# Patient Record
Sex: FEMALE | Race: WHITE
[De-identification: names, ages, dates, MRNs, and addresses within clinical notes are randomized per-mention and may not be internally consistent; named-entity substitution may affect disease eponyms.]

---

## 2018-02-25 ENCOUNTER — HOSPITAL ENCOUNTER (INPATIENT)
Dept: HOSPITAL 96 - M.ERS | Age: 73
LOS: 9 days | Discharge: NURSING, REHAB OR LONG-TERM CARE | DRG: 638 | End: 2018-03-06
Attending: FAMILY MEDICINE | Admitting: FAMILY MEDICINE
Payer: COMMERCIAL

## 2018-02-25 VITALS — SYSTOLIC BLOOD PRESSURE: 126 MMHG | DIASTOLIC BLOOD PRESSURE: 62 MMHG

## 2018-02-25 VITALS — SYSTOLIC BLOOD PRESSURE: 130 MMHG | DIASTOLIC BLOOD PRESSURE: 49 MMHG

## 2018-02-25 VITALS — WEIGHT: 220 LBS | BODY MASS INDEX: 35.36 KG/M2 | HEIGHT: 65.98 IN

## 2018-02-25 VITALS — SYSTOLIC BLOOD PRESSURE: 92 MMHG | DIASTOLIC BLOOD PRESSURE: 46 MMHG

## 2018-02-25 VITALS — SYSTOLIC BLOOD PRESSURE: 131 MMHG | DIASTOLIC BLOOD PRESSURE: 65 MMHG

## 2018-02-25 VITALS — DIASTOLIC BLOOD PRESSURE: 68 MMHG | SYSTOLIC BLOOD PRESSURE: 164 MMHG

## 2018-02-25 VITALS — DIASTOLIC BLOOD PRESSURE: 59 MMHG | SYSTOLIC BLOOD PRESSURE: 115 MMHG

## 2018-02-25 VITALS — DIASTOLIC BLOOD PRESSURE: 66 MMHG | SYSTOLIC BLOOD PRESSURE: 116 MMHG

## 2018-02-25 DIAGNOSIS — D50.9: ICD-10-CM

## 2018-02-25 DIAGNOSIS — E11.69: ICD-10-CM

## 2018-02-25 DIAGNOSIS — I25.10: ICD-10-CM

## 2018-02-25 DIAGNOSIS — Z90.49: ICD-10-CM

## 2018-02-25 DIAGNOSIS — L03.032: ICD-10-CM

## 2018-02-25 DIAGNOSIS — M86.8X7: ICD-10-CM

## 2018-02-25 DIAGNOSIS — L03.031: ICD-10-CM

## 2018-02-25 DIAGNOSIS — L97.519: ICD-10-CM

## 2018-02-25 DIAGNOSIS — Z79.899: ICD-10-CM

## 2018-02-25 DIAGNOSIS — F32.9: ICD-10-CM

## 2018-02-25 DIAGNOSIS — I10: ICD-10-CM

## 2018-02-25 DIAGNOSIS — Z85.850: ICD-10-CM

## 2018-02-25 DIAGNOSIS — K76.9: ICD-10-CM

## 2018-02-25 DIAGNOSIS — G25.81: ICD-10-CM

## 2018-02-25 DIAGNOSIS — Z87.442: ICD-10-CM

## 2018-02-25 DIAGNOSIS — R65.10: ICD-10-CM

## 2018-02-25 DIAGNOSIS — E11.40: ICD-10-CM

## 2018-02-25 DIAGNOSIS — E44.1: ICD-10-CM

## 2018-02-25 DIAGNOSIS — Z79.1: ICD-10-CM

## 2018-02-25 DIAGNOSIS — L97.529: ICD-10-CM

## 2018-02-25 DIAGNOSIS — N39.0: ICD-10-CM

## 2018-02-25 DIAGNOSIS — E11.51: ICD-10-CM

## 2018-02-25 DIAGNOSIS — B96.20: ICD-10-CM

## 2018-02-25 DIAGNOSIS — E78.5: ICD-10-CM

## 2018-02-25 DIAGNOSIS — Z95.5: ICD-10-CM

## 2018-02-25 DIAGNOSIS — K57.30: ICD-10-CM

## 2018-02-25 DIAGNOSIS — D72.829: ICD-10-CM

## 2018-02-25 DIAGNOSIS — E11.621: Primary | ICD-10-CM

## 2018-02-25 LAB
%HYPO/RBC NFR BLD AUTO: (no result) %
ABSOLUTE BASOPHILS: 0.1 THOU/UL (ref 0–0.2)
ABSOLUTE MONOCYTES: 1.1 THOU/UL (ref 0–1.2)
ALBUMIN SERPL-MCNC: 3.4 G/DL (ref 3.4–5)
ALP SERPL-CCNC: 76 U/L (ref 46–116)
ALT SERPL-CCNC: 25 U/L (ref 30–65)
ANION GAP SERPL CALC-SCNC: 7 MMOL/L (ref 7–16)
ANISOCYTOSIS BLD QL SMEAR: (no result)
AST SERPL-CCNC: 24 U/L (ref 15–37)
BACTERIA-REFLEX: (no result) /HPF
BASOPHILS NFR BLD AUTO: 1 %
BILIRUB SERPL-MCNC: 0.3 MG/DL
BILIRUB UR-MCNC: NEGATIVE MG/DL
BUN SERPL-MCNC: 22 MG/DL (ref 7–18)
CALCIUM SERPL-MCNC: 8.4 MG/DL (ref 8.5–10.1)
CHLORIDE SERPL-SCNC: 104 MMOL/L (ref 98–107)
CO2 SERPL-SCNC: 30 MMOL/L (ref 21–32)
COLOR UR: YELLOW
CREAT SERPL-MCNC: 1 MG/DL (ref 0.6–1.3)
GLUCOSE SERPL-MCNC: 171 MG/DL (ref 70–99)
GRANULOCYTES NFR BLD MANUAL: 78 %
HCT VFR BLD CALC: 27.9 % (ref 37–47)
HGB BLD-MCNC: 8.5 GM/DL (ref 12–15)
IRON SERPL-MCNC: 15 UG/DL (ref 50–175)
KETONES UR STRIP-MCNC: NEGATIVE MG/DL
LYMPHOCYTES # BLD: 1.5 THOU/UL (ref 0.8–5.3)
LYMPHOCYTES NFR BLD AUTO: 11 %
MCH RBC QN AUTO: 21.3 PG (ref 26–34)
MCHC RBC AUTO-ENTMCNC: 30.4 G/DL (ref 28–37)
MCV RBC: 69.9 FL (ref 80–100)
MICROCYTES: (no result)
MONOCYTES NFR BLD: 8 %
MPV: 8.9 FL. (ref 7.2–11.1)
NEUTROPHILS # BLD: 11 THOU/UL (ref 1.6–8.1)
NEUTS BAND NFR BLD: 2 %
NUCLEATED RBCS: 0 /100WBC
PLATELET # BLD EST: ADEQUATE 10*3/UL
PLATELET COUNT*: 327 THOU/UL (ref 150–400)
POTASSIUM SERPL-SCNC: 4 MMOL/L (ref 3.5–5.1)
PROT SERPL-MCNC: 6.8 G/DL (ref 6.4–8.2)
PROT UR QL STRIP: NEGATIVE
RBC # BLD AUTO: 4 MIL/UL (ref 4.2–5)
RBC # UR STRIP: (no result) /UL
RDW-CV: 19.4 % (ref 10.5–14.5)
SAO2 % BLD FROM PO2: 5 % (ref 20–39)
SODIUM SERPL-SCNC: 141 MMOL/L (ref 136–145)
SP GR UR STRIP: 1.02 (ref 1–1.03)
SQUAMOUS: (no result) /LPF (ref 0–3)
URINE CLARITY: CLEAR
URINE GLUCOSE-RANDOM: NEGATIVE
URINE LEUKOCYTES-REFLEX: (no result)
URINE NITRITE-REFLEX: POSITIVE
URINE WBC-REFLEX: (no result) /HPF (ref 0–5)
UROBILINOGEN UR STRIP-ACNC: 0.2 E.U./DL (ref 0.2–1)
WBC # BLD AUTO: 13.8 THOU/UL (ref 4–11)

## 2018-02-26 VITALS — DIASTOLIC BLOOD PRESSURE: 42 MMHG | SYSTOLIC BLOOD PRESSURE: 120 MMHG

## 2018-02-26 VITALS — SYSTOLIC BLOOD PRESSURE: 121 MMHG | DIASTOLIC BLOOD PRESSURE: 53 MMHG

## 2018-02-26 VITALS — SYSTOLIC BLOOD PRESSURE: 169 MMHG | DIASTOLIC BLOOD PRESSURE: 71 MMHG

## 2018-02-26 LAB
ANION GAP SERPL CALC-SCNC: 7 MMOL/L (ref 7–16)
BUN SERPL-MCNC: 17 MG/DL (ref 7–18)
CALCIUM SERPL-MCNC: 7.8 MG/DL (ref 8.5–10.1)
CHLORIDE SERPL-SCNC: 108 MMOL/L (ref 98–107)
CO2 SERPL-SCNC: 28 MMOL/L (ref 21–32)
CREAT SERPL-MCNC: 1.1 MG/DL (ref 0.6–1.3)
GLUCOSE SERPL-MCNC: 151 MG/DL (ref 70–99)
HCT VFR BLD CALC: 21.5 % (ref 37–47)
HCT VFR BLD CALC: 23 % (ref 37–47)
HGB BLD-MCNC: 6.5 GM/DL (ref 12–15)
HGB BLD-MCNC: 6.9 GM/DL (ref 12–15)
MAGNESIUM SERPL-MCNC: 1.9 MG/DL (ref 1.8–2.4)
MCH RBC QN AUTO: 21 PG (ref 26–34)
MCHC RBC AUTO-ENTMCNC: 30.2 G/DL (ref 28–37)
MCV RBC: 69.6 FL (ref 80–100)
MPV: 9.1 FL. (ref 7.2–11.1)
PLATELET COUNT*: 226 THOU/UL (ref 150–400)
POTASSIUM SERPL-SCNC: 4 MMOL/L (ref 3.5–5.1)
RBC # BLD AUTO: 3.1 MIL/UL (ref 4.2–5)
RDW-CV: 19.3 % (ref 10.5–14.5)
SODIUM SERPL-SCNC: 143 MMOL/L (ref 136–145)
WBC # BLD AUTO: 12.1 THOU/UL (ref 4–11)

## 2018-02-26 PROCEDURE — 30233N1 TRANSFUSION OF NONAUTOLOGOUS RED BLOOD CELLS INTO PERIPHERAL VEIN, PERCUTANEOUS APPROACH: ICD-10-PCS | Performed by: FAMILY MEDICINE

## 2018-02-27 VITALS — SYSTOLIC BLOOD PRESSURE: 140 MMHG | DIASTOLIC BLOOD PRESSURE: 71 MMHG

## 2018-02-27 VITALS — DIASTOLIC BLOOD PRESSURE: 64 MMHG | SYSTOLIC BLOOD PRESSURE: 114 MMHG

## 2018-02-27 VITALS — SYSTOLIC BLOOD PRESSURE: 167 MMHG | DIASTOLIC BLOOD PRESSURE: 82 MMHG

## 2018-02-27 LAB
ALBUMIN SERPL-MCNC: 2.8 G/DL (ref 3.4–5)
ALP SERPL-CCNC: 67 U/L (ref 46–116)
ALT SERPL-CCNC: 24 U/L (ref 30–65)
ANION GAP SERPL CALC-SCNC: 7 MMOL/L (ref 7–16)
AST SERPL-CCNC: 28 U/L (ref 15–37)
BILIRUB SERPL-MCNC: 0.4 MG/DL
BUN SERPL-MCNC: 16 MG/DL (ref 7–18)
CALCIUM SERPL-MCNC: 8.3 MG/DL (ref 8.5–10.1)
CHLORIDE SERPL-SCNC: 103 MMOL/L (ref 98–107)
CO2 SERPL-SCNC: 29 MMOL/L (ref 21–32)
CREAT SERPL-MCNC: 0.7 MG/DL (ref 0.6–1.3)
GLUCOSE SERPL-MCNC: 134 MG/DL (ref 70–99)
HCT VFR BLD CALC: 24.1 % (ref 37–47)
HGB BLD-MCNC: 7.5 GM/DL (ref 12–15)
MAGNESIUM SERPL-MCNC: 1.9 MG/DL (ref 1.8–2.4)
MCH RBC QN AUTO: 21.8 PG (ref 26–34)
MCHC RBC AUTO-ENTMCNC: 31.3 G/DL (ref 28–37)
MCV RBC: 69.9 FL (ref 80–100)
MPV: 9.7 FL. (ref 7.2–11.1)
PLATELET COUNT*: 250 THOU/UL (ref 150–400)
POTASSIUM SERPL-SCNC: 3.6 MMOL/L (ref 3.5–5.1)
PROT SERPL-MCNC: 5.7 G/DL (ref 6.4–8.2)
RBC # BLD AUTO: 3.44 MIL/UL (ref 4.2–5)
RDW-CV: 19.7 % (ref 10.5–14.5)
SODIUM SERPL-SCNC: 139 MMOL/L (ref 136–145)
WBC # BLD AUTO: 12.8 THOU/UL (ref 4–11)

## 2018-02-27 NOTE — EKG
[place] [address]
Phone:  [phone]                     ELECTROCARDIOGRAM REPORT      
_______________________________________________________________________________
 
Name:       SAMI LINARES             Room:           [address]    ADM [place]  
M.R.#:  [id number]      Account #:      [id number]  
Admission:  18     Attend Phys:    Fabian Nayak, 
Discharge:               Date of Birth:  [birth year]  
         Report #: [id number]
    [id number]
_______________________________________________________________________________
THIS REPORT FOR:  //name//                      
 
                          [facility]
                                       
Test Date:    2018               Test Time:    09:05:27
Pat Name:     SAMI LINARES           Department:   
Patient ID:   [id number]            Room:         [address]
Gender:       F                        Technician:   FITZ
:          1945               Requested By: Hawk Victoria
Order Number: [id number]    Reading MD:   Alfie Kee
                                 Measurements
Intervals                              Axis          
Rate:         91                       P:            73
OK:           164                      QRS:          10
QRSD:         86                       T:            75
QT:           355                                    
QTc:          437                                    
                           Interpretive Statements
Sinus rhythm
Nonspecific T abnormalities, lateral leads
Baseline wander in lead(s) I
Compared to ECG 2012 17:45:22
T-wave abnormality now present
Sinus tachycardia no longer present
 
Electronically Signed On 2018 15:22:05 CST by Alfie Kee
[email or web address]
 
 
 
 
 
 
 
 
 
 
 
 
 
 
 
 
  <ELECTRONICALLY SIGNED>
                                           By: Alfie Kee MD, [facility]   
  18     1522
D: 18 0905   _____________________________________
T: 18 0905   Alfie Kee MD, [facility]     /EPI

## 2018-02-28 VITALS — SYSTOLIC BLOOD PRESSURE: 157 MMHG | DIASTOLIC BLOOD PRESSURE: 68 MMHG

## 2018-02-28 VITALS — SYSTOLIC BLOOD PRESSURE: 142 MMHG | DIASTOLIC BLOOD PRESSURE: 74 MMHG

## 2018-02-28 VITALS — DIASTOLIC BLOOD PRESSURE: 87 MMHG | SYSTOLIC BLOOD PRESSURE: 135 MMHG

## 2018-02-28 LAB
ALBUMIN SERPL-MCNC: 2.4 G/DL (ref 3.4–5)
ALP SERPL-CCNC: 60 U/L (ref 46–116)
ALT SERPL-CCNC: 22 U/L (ref 30–65)
ANION GAP SERPL CALC-SCNC: 5 MMOL/L (ref 7–16)
AST SERPL-CCNC: 26 U/L (ref 15–37)
BILIRUB SERPL-MCNC: 0.4 MG/DL
BUN SERPL-MCNC: 14 MG/DL (ref 7–18)
CALCIUM SERPL-MCNC: 8 MG/DL (ref 8.5–10.1)
CHLORIDE SERPL-SCNC: 103 MMOL/L (ref 98–107)
CO2 SERPL-SCNC: 31 MMOL/L (ref 21–32)
CREAT SERPL-MCNC: 0.8 MG/DL (ref 0.6–1.3)
GLUCOSE SERPL-MCNC: 83 MG/DL (ref 70–99)
HCT VFR BLD CALC: 23.9 % (ref 37–47)
HGB BLD-MCNC: 7.8 GM/DL (ref 12–15)
MAGNESIUM SERPL-MCNC: 1.7 MG/DL (ref 1.8–2.4)
MCH RBC QN AUTO: 22.4 PG (ref 26–34)
MCHC RBC AUTO-ENTMCNC: 32.4 G/DL (ref 28–37)
MCV RBC: 69.2 FL (ref 80–100)
MPV: 9.3 FL. (ref 7.2–11.1)
PLATELET COUNT*: 259 THOU/UL (ref 150–400)
POTASSIUM SERPL-SCNC: 3.4 MMOL/L (ref 3.5–5.1)
PROT SERPL-MCNC: 5.8 G/DL (ref 6.4–8.2)
RBC # BLD AUTO: 3.46 MIL/UL (ref 4.2–5)
RDW-CV: 20 % (ref 10.5–14.5)
SODIUM SERPL-SCNC: 139 MMOL/L (ref 136–145)
WBC # BLD AUTO: 9.7 THOU/UL (ref 4–11)

## 2018-02-28 PROCEDURE — 0DJD8ZZ INSPECTION OF LOWER INTESTINAL TRACT, VIA NATURAL OR ARTIFICIAL OPENING ENDOSCOPIC: ICD-10-PCS | Performed by: INTERNAL MEDICINE

## 2018-02-28 PROCEDURE — 0DJ08ZZ INSPECTION OF UPPER INTESTINAL TRACT, VIA NATURAL OR ARTIFICIAL OPENING ENDOSCOPIC: ICD-10-PCS | Performed by: INTERNAL MEDICINE

## 2018-02-28 NOTE — CON
[address]  [address]                    CONSULTATION                  
_______________________________________________________________________________
 
Name:       SAMI LINARES             Room:           [address] [place]  
M.R.#:  [id number]      Account #:      [id number]  
Admission:  02/25/18     Attend Phys:    Fabian Nayak, 
Discharge:               Date of Birth:  [birth year]  
         Report #: [id number]
                                                                     [id number]  
_______________________________________________________________________________
THIS REPORT FOR:  //name//                      
 
CC: Kg Brito
    Fabian Nayak
 
DATE OF SERVICE:  02/25/2018
 
 
CHIEF COMPLAINT:  Podiatric consultation for bilateral toe wounds with lower
extremity pain and cellulitis.  The patient is known to me from prior hospital
admissions.  She had hardware removal from her spine last year at [facility].  She has limited ambulation, she spends most of the day in a wheelchair.
 She is able to stand and walk short distances with a walker.  She lives at home
alone, her son-in-law does check on her frequently.  She has diabetic peripheral
neuropathy, and uses her feet to move around her house in the wheelchair as well
as push open doors.  She relates pain to both legs and feet.  Right foot x-ray
is negative for bone destruction.  Arterial duplex Doppler negative for stenosis
or significant vasculopathy.  Blood cultures negative x 2.  She is on parenteral
vancomycin and ceftriaxone with good tolerance.  She has been afebrile during
her stay.  Urine culture pending.
 
LABORATORY DATA:  WBC 13.8, RBC 4.0, hemoglobin 8.5, hematocrit 27.9, platelets
327, BUN 22, creatinine 1.0, glucose 171.
 
PHYSICAL EXAMINATION:  Temperature 98.1, pulse 103, respirations 16, blood
pressure 92/46.  The patient has multiple thickness wounds to the lesser toes
bilaterally.  The left third, fourth and fifth digits have the most involvement,
with full thickness ulcerations with dry crusty scabs overlying them.  There is
localized inflammation consistent with cellulitis, and the toes are tender to
the touch.  The right second, third, fourth and fifth toes have smaller lesions
without as much inflammation.  She has palpable dorsalis pedis and posterior
tibial pulses bilaterally.  She has immediate digital capillary refill.  She has
+3 nonpitting edema to both legs, the left is slightly worse.  Negative Homans'
or Vega sign either leg.  No popliteal adenopathy or calf tenderness.
 
IMPRESSION:  Digital ulcerations with localized cellulitis.
 
PLAN:  I took an aerobic swab culture of the left third, fourth and fifth toes. 
The toes were cleansed and dressed with foam and Kerlix gauze.  I will follow
the patient during her hospitalization awaiting foot MRI to evaluate for
osteomyelitis.
 
 
 
<ELECTRONICALLY SIGNED>
                                        By:  Noam Perkins DPM          
02/28/18     1310
D: 02/25/18 1049_______________________________________
T: 02/25/18 1100Daabimael Perkins DPM             /nt

## 2018-02-28 NOTE — CON
[address]  [address]                    CONSULTATION                  
_______________________________________________________________________________
 
Name:       SAMI LINARES             Room:           [address]    ADM [place]  
M.R.#:  [id number]      Account #:      [id number]  
Admission:  02/25/18     Attend Phys:    Fabian Nayak, 
Discharge:               Date of Birth:  [birth year]  
         Report #: [id number]
                                                                     [id number]  
_______________________________________________________________________________
THIS REPORT FOR:  //name//                      
 
CC: Kg Brito
    Fabian Nayak
 
DATE OF SERVICE:  02/27/2018
 
 
CHIEF COMPLAINT:  Followup of bilateral toe wounds complicated by diabetes
mellitus with peripheral neuropathy and lumbar radiculopathy with prior back
surgery and left toe cultures growing methicillin-sensitive Staph aureus.  She
is on parenteral ceftriaxone with good tolerance.  Denies fevers, chills, nausea
or malaise.  Arterial Doppler of the extremities was negative for
hemodynamically significant stenosis.  Urine culture grew E. coli.  She is
currently not in a position to have her foot unwrapped.
 
LABORATORY DATA:  WBC 12.8, RBC 3.44, hemoglobin 7.5, hematocrit 24.1, platelets
250, BUN 16, creatinine 0.7, glucose 134.
 
PHYSICAL EXAMINATION:  Her dressing is dry and clean with no bleed through.
 
ASSESSMENT AND PLAN:  I am unable to unwrap the bandage since the patient is
indisposed.  I did briefly speak to her and I will examine her foot and change
her bandage in the morning.  I will consult Infectious Disease for antibiotic
selection.
 
 
 
 
 
 
 
 
 
 
 
 
 
 
 
 
 
 
 
<ELECTRONICALLY SIGNED>
                                        By:  Noam Perkins DPM          
02/28/18     1310
D: 02/27/18 1748_______________________________________
T: 02/28/18 0158Noam Perkins, J CARLOS             /nt

## 2018-02-28 NOTE — CON
[address]  [address]                    CONSULTATION                  
_______________________________________________________________________________
 
Name:       VIJAYSAMI CASTILLO             Room:           [address]    ADM [place]  
M.R.#:  [id number]      Account #:      [id number]  
Admission:  02/25/18     Attend Phys:    Fabian Nayak, 
Discharge:               Date of Birth:  [birth year]  
         Report #: [id number]
                                                                     [id number]  
_______________________________________________________________________________
THIS REPORT FOR:  //name//                      
 
CC: Kg Nayak
 
DATE OF SERVICE:  02/27/2018
 
 
ATTENDING PHYSICIAN:  Dr. Hortencia De Jesus.
 
REASON FOR EVALUATION:  Ischemic lower extremities complicated by multiple
superficial ulcers, inflammatory eruption, also has a complicated urinary tract
infection.
 
HISTORY OF PRESENT ILLNESS:  Chart reviewed, patient examined.  This is a
73-year-old woman with history of known vasculopathy, has diabetes mellitus who
is somewhat lethargic, at least mildly encephalopathic, is not able to give too
much details, was admitted on the 25th.  She describes somewhat abrupt onset of
right lower extremity pain, associated involuntary movements and muscle jerks. 
She is uncertain if she had fevers.  She has had a diminished appetite with poor
p.o. intake.  Initially said breathing was okay, then restated that she has had
some trouble.  She was evaluated and was found to have leukocytosis with
moderate pyuria.  Lactic acid was 1.6, CRP of 52, sed rate of 23.  Plain film of
the right and left foot showed really no bony abnormalities.  CT abdomen and
pelvis, there was question of a right iliopsoas iliacus inflammatory process,
perhaps hematoma.  ABIs showed compromise of the right lower extremity with
normal left-sided results.  Urine culture now with growth of gram-negative rods.
 She has positive wound culture with growth of Staphylococcus aureus,
tentatively methicillin susceptible.  Started empirically on ceftriaxone and
vancomycin, was given a dose of levofloxacin as well.
 
ALLERGIES:  None known.
 
MEDICATIONS:  Metformin, tramadol, ropinirole, ceftriaxone, p.r.n. analgesics
and antiemetics, vancomycin, linagliptin, famotidine, calcium carbonate,
prednisone 10 daily, insulin, levothyroxine, duloxetine, pregabalin.
 
PAST MEDICAL HISTORY:  History of diabetes with vasculopathy, coronary artery
disease, hyperlipidemia, renal lithiasis, previous history of thyroid cancer,
back surgery.
 
SOCIAL HISTORY:  Nonsmoker, no ethanol.
 
FAMILY HISTORY:  Noncontributory.
 
REVIEW OF SYSTEMS:  Not reliably obtained.
 
 
 
[place] [address]                    CONSULTATION                  
_______________________________________________________________________________
 
Name:       SAMI LINARES             Room:           [address] [place]  
[facility].#:  [id number]      Account #:      [id number]  
Admission:  02/25/18     Attend Phys:    Fabian Nayak, 
Discharge:               Date of Birth:  [birth year]  
         Report #: [id number]
                                                                     [id number]  
_______________________________________________________________________________
 
PHYSICAL EXAMINATION:
GENERAL:  She is in moderate to marked distress at times during the exam,
especially with manipulation of her lower extremities she retracts.  She
describes involuntary movement and notes severe pain.  Overall, she is quite
lethargic.  She has got some tachypnea.  She appears to be chronically ill,
undernourished.
VITAL SIGNS:  Temperature 98.2, pulse 98, respirations 16, blood pressure
114/64.
SKIN:  Warm.  It is dry to excess especially the lower extremities.
NECK:  Appears to be supple.
LUNGS:  Diminished breath sounds.  Few scattered crackles.
HEART:  Regular.  Borderline tachycardic, has a soft systolic murmur.
ABDOMEN:  Soft.  There is no apparent tenderness, no peritoneal signs.
EXTREMITIES:  Lower extremity has appearance of ischemia.  There are some
multiple superficial wounds with eschar, does have wounds, particularly lower
extremity there is one that actually bleeds easily on the left side through the
lateral foot at the base on to the mid portion of the fifth digit.  Really
disallow me of doing any manipulation or palpation of the foot.  I do not
appreciate any fluctuance.  Does not appear to have any deep seated ulcers, no
overt gangrenous changes.
GENITOURINARY:  Deferred.
RECTAL:  Deferred.
 
LABORATORY DATA:  Electrolytes:  Sodium 139, potassium 3.6, chloride 103,
bicarbonate is 29, anion gap of 7, BUN and creatinine 16 and 0.7, glucose of
134, albumin of 2.8, total protein 5.7, estimated GFR of 82.  LFTs unremarkable.
 CBC:  White count 4.8, H and H 7.5 and 24.1, platelets of 250.  Abdominal
ultrasound showed low-density liver lesion.  Cultures described above,
presumptive MSSA.  Urine culture, greater than 10-15 gram-negative rods.  Blood
cultures are sterile thus far.  CT abdomen and pelvis noted the enlargement
and/or thickening of the right iliopsoas, the right iliacus musculature with
surrounding tissue strandings suggest intramuscular hematoma, adjacent
inflammation.
 
ASSESSMENT:  Complicated urinary tract infection.  Certainly, this may well be a
complication of poor p.o. intake including fluids with bladder urine stasis.  We
will await results.  Ceftriaxone is a reasonable choice.  May give her a single
dose of gentamicin as well pending more problematic bilateral lower extremities
suggest ischemic type process, multiple wounds, really severe pain.  It is hard
to define whether it is all related to the distal lower extremities or perhaps
has irritation due to the right psoas, although interestingly she retracts more
with the left side exam as if that is more severe pain.  Continue the
vancomycin.  We have culture evidence of Staphylococcus aureus.  We will await
final results.  If it is not methicillin-resistant Staphylococcus aureus, we
 
 
 
[address]  [address]                    CONSULTATION                  
_______________________________________________________________________________
 
Name:       SAMI LINARES             Room:           [address]    ADM [place]  
M.R.#:  [id number]      Account #:      [id number]  
Admission:  02/25/18     Attend Phys:    Fabian Nayak, 
Discharge:               Date of Birth:  [birth year]  
         Report #: [id number]
                                                                     [id number]  
_______________________________________________________________________________
will probably narrow down therapy.  She does appear to be quite tenuous at this
point.  We will monitor expectantly.
 
 
 
 
 
 
 
 
 
 
 
 
 
 
 
 
 
 
 
 
 
 
 
 
 
 
 
 
 
 
 
 
 
 
 
 
 
 
 
 
 
 
<ELECTRONICALLY SIGNED>
                                        By:  Pradeep Wilder MD           
02/28/18     1211
D: 02/27/18 1022_______________________________________
T: 02/27/18 1330Pradeep Wilder MD              /nt

## 2018-03-01 VITALS — SYSTOLIC BLOOD PRESSURE: 135 MMHG | DIASTOLIC BLOOD PRESSURE: 78 MMHG

## 2018-03-01 VITALS — SYSTOLIC BLOOD PRESSURE: 194 MMHG | DIASTOLIC BLOOD PRESSURE: 98 MMHG

## 2018-03-01 VITALS — SYSTOLIC BLOOD PRESSURE: 161 MMHG | DIASTOLIC BLOOD PRESSURE: 77 MMHG

## 2018-03-01 LAB
ALBUMIN SERPL-MCNC: 2.9 G/DL (ref 3.4–5)
ALP SERPL-CCNC: 70 U/L (ref 46–116)
ALT SERPL-CCNC: 25 U/L (ref 30–65)
ANION GAP SERPL CALC-SCNC: 9 MMOL/L (ref 7–16)
AST SERPL-CCNC: 25 U/L (ref 15–37)
BILIRUB SERPL-MCNC: 0.5 MG/DL
BUN SERPL-MCNC: 13 MG/DL (ref 7–18)
CALCIUM SERPL-MCNC: 8.4 MG/DL (ref 8.5–10.1)
CHLORIDE SERPL-SCNC: 100 MMOL/L (ref 98–107)
CO2 SERPL-SCNC: 32 MMOL/L (ref 21–32)
CREAT SERPL-MCNC: 0.8 MG/DL (ref 0.6–1.3)
GLUCOSE SERPL-MCNC: 92 MG/DL (ref 70–99)
HCT VFR BLD CALC: 28.8 % (ref 37–47)
HGB BLD-MCNC: 8.8 GM/DL (ref 12–15)
MAGNESIUM SERPL-MCNC: 2.1 MG/DL (ref 1.8–2.4)
MCH RBC QN AUTO: 21.5 PG (ref 26–34)
MCHC RBC AUTO-ENTMCNC: 30.7 G/DL (ref 28–37)
MCV RBC: 70.2 FL (ref 80–100)
MPV: 8.6 FL. (ref 7.2–11.1)
PLATELET COUNT*: 330 THOU/UL (ref 150–400)
POTASSIUM SERPL-SCNC: 3.1 MMOL/L (ref 3.5–5.1)
PROT SERPL-MCNC: 6.7 G/DL (ref 6.4–8.2)
RBC # BLD AUTO: 4.1 MIL/UL (ref 4.2–5)
RDW-CV: 20.2 % (ref 10.5–14.5)
SODIUM SERPL-SCNC: 141 MMOL/L (ref 136–145)
WBC # BLD AUTO: 11.3 THOU/UL (ref 4–11)

## 2018-03-02 VITALS — SYSTOLIC BLOOD PRESSURE: 154 MMHG | DIASTOLIC BLOOD PRESSURE: 63 MMHG

## 2018-03-02 VITALS — DIASTOLIC BLOOD PRESSURE: 84 MMHG | SYSTOLIC BLOOD PRESSURE: 184 MMHG

## 2018-03-03 VITALS — DIASTOLIC BLOOD PRESSURE: 73 MMHG | SYSTOLIC BLOOD PRESSURE: 135 MMHG

## 2018-03-03 VITALS — DIASTOLIC BLOOD PRESSURE: 81 MMHG | SYSTOLIC BLOOD PRESSURE: 151 MMHG

## 2018-03-03 VITALS — DIASTOLIC BLOOD PRESSURE: 68 MMHG | SYSTOLIC BLOOD PRESSURE: 157 MMHG

## 2018-03-03 LAB
HCT VFR BLD CALC: 27.4 % (ref 37–47)
HGB BLD-MCNC: 8.7 GM/DL (ref 12–15)

## 2018-03-03 PROCEDURE — 05HB33Z INSERTION OF INFUSION DEVICE INTO RIGHT BASILIC VEIN, PERCUTANEOUS APPROACH: ICD-10-PCS | Performed by: FAMILY MEDICINE

## 2018-03-04 VITALS — SYSTOLIC BLOOD PRESSURE: 138 MMHG | DIASTOLIC BLOOD PRESSURE: 82 MMHG

## 2018-03-04 VITALS — DIASTOLIC BLOOD PRESSURE: 76 MMHG | SYSTOLIC BLOOD PRESSURE: 165 MMHG

## 2018-03-04 VITALS — DIASTOLIC BLOOD PRESSURE: 68 MMHG | SYSTOLIC BLOOD PRESSURE: 138 MMHG

## 2018-03-05 VITALS — SYSTOLIC BLOOD PRESSURE: 134 MMHG | DIASTOLIC BLOOD PRESSURE: 78 MMHG

## 2018-03-05 VITALS — DIASTOLIC BLOOD PRESSURE: 79 MMHG | SYSTOLIC BLOOD PRESSURE: 152 MMHG

## 2018-03-05 VITALS — SYSTOLIC BLOOD PRESSURE: 144 MMHG | DIASTOLIC BLOOD PRESSURE: 73 MMHG

## 2018-03-06 VITALS — SYSTOLIC BLOOD PRESSURE: 149 MMHG | DIASTOLIC BLOOD PRESSURE: 86 MMHG

## 2018-03-06 VITALS — DIASTOLIC BLOOD PRESSURE: 86 MMHG | SYSTOLIC BLOOD PRESSURE: 149 MMHG

## 2018-03-07 NOTE — CON
[address]  [address]                    CONSULTATION                  
_______________________________________________________________________________
 
Name:       SAMI LINARES             Room:           [address] [place]  
M.R.#:  [id number]      Account #:      [id number]  
Admission:  02/25/18     Attend Phys:    Fabian Nayak, 
Discharge:  03/06/18     Date of Birth:  [birth year]  
         Report #: [id number]
                                                                     [id number]  
_______________________________________________________________________________
THIS REPORT FOR:  //name//                      
 
CC: Kg Brito
    Fabian Nayak
 
DATE OF SERVICE:  02/28/2018
 
 
CHIEF COMPLAINT:  Followup of ulcerations to the left dorsal third, fourth and
fifth toes and the right second and fifth toes complicated by type 2 diabetes
mellitus with peripheral neuropathy.  She is scheduled for EGD and colonoscopy
today for possible GI bleed, she has anemia.  She is on parenteral ceftriaxone
with good tolerance.  I did consult infectious disease to manage her IV
antibiotics, recent cultures grew methicillin-sensitive Staph aureus, she
relates some left foot and toe pain.  She has been afebrile with no
constitutional symptoms.
 
LABS:  WBC 9.7, RBC 3.46, hemoglobin 7.8, hematocrit 23.9, platelets 259.  BUN
14, creatinine 0.8.
 
PHYSICAL EXAMINATION:  Both lower extremities are improved with decreased
inflammation to both legs and digits.  The ulcerations to the left dorsal third,
fourth and fifth toes have decreased inflammation with more red granulation and
less slough.  There is no exposed bone, tendon or joint.  The right second and
fifth toe wounds are rather dry with minimal inflammation and no victor manuel
cellulitis to them, less inflammation to both legs as well.  Feet are warm with
palpable pedal pulses, no pallor or cyanosis.
 
IMPRESSION:  Toe ulcerations to both feet, resolving cellulitis, urinary tract
infection, diabetes mellitus with peripheral neuropathy, and anemia.
 
PLAN:  I cleansed the wounds and placed silver foam and Kerlix to them. 
Reviewed vascular surgery's note, no vascular intervention recommended.  I do
not recommend any formal wound debridement in the operating room, and there is
no indication for toe amputation.  We will proceed with topical wound care and
antibiotics.
 
 
 
 
 
 
 
 
<ELECTRONICALLY SIGNED>
                                        By:  Noam Perkins DPM          
03/07/18     1249
D: 02/28/18 0834_______________________________________
T: 02/28/18 0946Daabimael Perkins DPM             /nt

## 2018-03-08 NOTE — CON
[address]  [address]                    CONSULTATION                  
_______________________________________________________________________________
 
Name:       VIJAYSAMI CASTILLO             Room:           [address] [place]  
M.R.#:  [id number]      Account #:      [id number]  
Admission:  02/25/18     Attend Phys:    Fabian Nayak, 
Discharge:  03/06/18     Date of Birth:  [birth year]  
         Report #: [id number]
                                                                     [id number]  
_______________________________________________________________________________
THIS REPORT FOR:  //name//                      
 
CC: Kg Brito DO
    Fabian Nayak
 
DICTATED BY: Noemi Polo [facility]
 
DATE OF SERVICE:  02/26/2018
 
 
Please note at the time of this dictation, the patient was seen and physically
examined by myself.
 
REASON FOR CONSULTATION:  Acute anemia.
 
HISTORY OF PRESENT ILLNESS:  This is a 73-year-old female presented to the
emergency room with having right leg pain, which was persistent and she had some
jerking movement noted with this as well.  The patient states that she does not
recall ever having an EGD or colonoscopy done in her past.  She does take NSAIDs
on a nightly basis to help she says with the jerking and some of the pain along
with her other medications.  She is unclear as to how much she is taking, but it
has been on a fairly consistent basis, is gathering from her son.  She denies
any nausea, vomiting, difficulty swallowing, or any abdominal pain.  Denies any
issues with her bowels, she states she goes daily, soft and formed.  She has not
noticed any black or tarry stools or any bright red blood.  She states she has
never been told before that she was anemic either.  She did see her PCP, she
thought a couple of months ago and they did blood work and she was never told
that she had any problems at that time.
 
ALLERGIES:  No known drug allergies.
 
MEDICATIONS FROM HOME:  Include Lyrica, Cymbalta, prednisone, oxycodone,
Glucophage, Januvia, Caltrate and Synthroid.
 
PAST MEDICAL HISTORY:  Diabetes, hyperlipidemia, hypertension, diabetic
neuropathy, depression, B12 deficiency, peripheral vascular disease, significant
coronary artery disease.
 
PAST SURGICAL HISTORY:  She has had stent placement, appendectomy,
thyroidectomy, history of kidney stones, thyroid cancer, and back surgery at .
 
FAMILY HISTORY:  Negative for any GI or female cancers per the patient.
 
SOCIAL HISTORY:  The patient is in a wheelchair due to her peripheral neuropathy
and back discomfort, but lives by herself.  Denies any alcohol, tobacco or
 
 
 
[place] [address]                    CONSULTATION                  
_______________________________________________________________________________
 
Name:       SAMI LINARES             Room:           [address]#:  [id number]      Account #:      [id number]  
Admission:  02/25/18     Attend Phys:    Fabian Nayak, 
Discharge:  03/06/18     Date of Birth:  [birth year]  
         Report #: [id number]
                                                                     [id number]  
_______________________________________________________________________________
illegal drug use at this time.
 
REVIEW OF SYSTEMS:  Twelve-point review of systems is essentially negative
except what is mentioned in the HPI.
 
PHYSICAL EXAMINATION:
VITAL SIGNS:  Temperature 36.7, pulse 95, respirations 19, and blood pressure
121/53.
HEART:  Regular rate and rhythm.
LUNGS:  Clear, but slightly diminished.
ABDOMEN:  Soft, positive bowel sounds in all 4 quadrants with no masses or
tenderness noted.
 
LABS:  Hemoglobin on admission was 8.5, she is down to 6.5; hematocrit 23, white
count is 12.1 and platelets 226.  Sodium 143, potassium 4, chloride 108, CO2 of
28, BUN 17, creatinine 1.7, GFR is 48 with a glucose of 151.  Her iron is 15,
TIBC is 283.  Percentage saturation is 5.  Ferritin is 43.  B12 is 612. 
Positive urine for blood and nitrites noted as well.  ESR was 23.  CT of the
abdomen and pelvis showed an indeterminate 2 cm hypodense liver lesion,
otherwise essentially negative.
 
IMPRESSION:
1.  Acute anemia, microcytic.
2.  Leukocytosis.
3.  Nonsteroidal anti-inflammatory drug use regularly.
4.  Liver lesion.
5.  Peripheral vascular disease, severe.
 
PLAN:
1.  Ultrasound results pending.
2.  Labs, we will get a soluble transferrin level and recheck CBC and CMP in the
a.m.
3.  We will await above results and then consider EGD and colonoscopy on
Wednesday if ultrasound is negative.
4.  Monitor for overt bleeding.
 
Thank you for allowing us to participate in this patient's care.  Please do not
hesitate to call with any questions in regard to this consult.
 
I have personally seen and examined the patient and reviewed the labs and
imaging studies.  The patient reports that she came into the hospital because of
her right eye was hurting.  She was found to have a right thigh hematoma.  She
also has diabetic foot ulcer.  The patient found to have anemia with hemoglobin
of 6.5.  She has received packed RBC and we are waiting her hemoglobin results. 
She reports that she has never had endoscopic evaluation but denies any GI
symptoms as she denies hematochezia, melena, change in stool habits and caliber.
 
 
 
[address]  [address]                    CONSULTATION                  
_______________________________________________________________________________
 
Name:       SAMI LINARES             Room:           [address] [place]  
.R.#:  [id number]      Account #:      [id number]  
Admission:  02/25/18     Attend Phys:    Fabian Nayak, 
Discharge:  03/06/18     Date of Birth:  [birth year]  
         Report #: [id number]
                                                                     [id number]  
_______________________________________________________________________________
 She also denies any dysphagia or odynophagia or GERD.  Iron study was obtained,
which shows that she has iron saturation of 5%.  We will go ahead and perform an
upper and lower endoscopy to further evaluate her anemia.  We will make further
recommendation after this is complete.
 
 
 
 
 
 
 
 
 
 
 
 
 
 
 
 
 
 
 
 
 
 
 
 
 
 
 
 
 
 
 
 
 
 
 
 
 
 
 
 
<ELECTRONICALLY SIGNED>
                                        By:  Hawk Victoria MD            
03/08/18     1450
D: 02/26/18 1231_______________________________________
T: 02/26/18 1742Hawk Victoria MD               /nt

## 2018-03-08 NOTE — CON
[address]  [address]                    CONSULTATION                  
_______________________________________________________________________________
 
Name:       SAMI LINARES             Room:           [address] [place]  
M.R.#:  [id number]      Account #:      [id number]  
Admission:  02/25/18     Attend Phys:    Fabian Nayak, 
Discharge:  03/06/18     Date of Birth:  [birth year]  
         Report #: [id number]
                                                                     [id number]  
_______________________________________________________________________________
THIS REPORT FOR:  //name//                      
 
CC: Kg Brito
    Fabian Nayak
 
DATE OF SERVICE:  02/26/2018
 
 
ADDENDUM TO CONSULTATION 
 
CONSULTATION NUMBER:  [id number].
 
I have personally seen and examined the patient and reviewed the labs and
imaging studies.  The patient reports that she came into the hospital because of
her right eye was hurting.  She was found to have a right thigh hematoma.  She
also has diabetic foot ulcer.  The patient found to have anemia with hemoglobin
of 6.5.  She has received packed RBC and we are waiting her hemoglobin results. 
She reports that she has never had endoscopic evaluation but denies any GI
symptoms as she denies hematochezia, melena, change in stool habits and caliber.
 She also denies any dysphagia or odynophagia or GERD.  Iron study was obtained,
which shows that she has iron saturation of 5%.  We will go ahead and perform an
upper and lower endoscopy to further evaluate her anemia.  We will make further
recommendation after this is complete.
 
 
 
 
 
 
 
 
 
 
 
 
 
 
 
 
 
 
 
 
 
<ELECTRONICALLY SIGNED>
                                        By:  Hawk Victoria MD            
03/08/18     1450
D: 02/26/18 1702_______________________________________
T: 02/26/18 2053Hawk Victoria MD               /nt

## 2018-03-14 ENCOUNTER — HOSPITAL ENCOUNTER (OUTPATIENT)
Dept: HOSPITAL 96 - M.WC | Age: 73
End: 2018-03-14
Attending: PODIATRIST
Payer: COMMERCIAL

## 2018-03-14 DIAGNOSIS — Z85.828: ICD-10-CM

## 2018-03-14 DIAGNOSIS — F32.9: ICD-10-CM

## 2018-03-14 DIAGNOSIS — E11.40: ICD-10-CM

## 2018-03-14 DIAGNOSIS — E11.69: ICD-10-CM

## 2018-03-14 DIAGNOSIS — E11.621: Primary | ICD-10-CM

## 2018-03-14 DIAGNOSIS — M86.8X7: ICD-10-CM

## 2018-03-14 DIAGNOSIS — E78.5: ICD-10-CM

## 2018-03-14 DIAGNOSIS — L97.521: ICD-10-CM

## 2018-03-14 DIAGNOSIS — L97.511: ICD-10-CM

## 2018-03-21 ENCOUNTER — HOSPITAL ENCOUNTER (OUTPATIENT)
Dept: HOSPITAL 96 - M.WC | Age: 73
End: 2018-03-21
Attending: PODIATRIST
Payer: COMMERCIAL

## 2018-03-21 DIAGNOSIS — F32.9: ICD-10-CM

## 2018-03-21 DIAGNOSIS — E11.621: Primary | ICD-10-CM

## 2018-03-21 DIAGNOSIS — L97.511: ICD-10-CM

## 2018-03-21 DIAGNOSIS — I25.10: ICD-10-CM

## 2018-03-21 DIAGNOSIS — M86.8X7: ICD-10-CM

## 2018-03-21 DIAGNOSIS — L89.893: ICD-10-CM

## 2018-03-21 DIAGNOSIS — M20.40: ICD-10-CM

## 2018-03-21 DIAGNOSIS — Z85.850: ICD-10-CM

## 2018-03-21 DIAGNOSIS — G60.3: ICD-10-CM

## 2018-03-21 DIAGNOSIS — E78.5: ICD-10-CM

## 2018-03-21 DIAGNOSIS — E11.51: ICD-10-CM

## 2018-03-21 DIAGNOSIS — E11.40: ICD-10-CM

## 2018-03-21 DIAGNOSIS — L97.521: ICD-10-CM

## 2018-03-21 DIAGNOSIS — E11.69: ICD-10-CM

## 2018-03-21 DIAGNOSIS — I10: ICD-10-CM

## 2018-03-22 NOTE — CON
[address]  [address]                    CONSULTATION                  
_______________________________________________________________________________
 
Name:       SAMI LINARES             Room:                      [facility] 
M.R.#:  [id number]      Account #:      [id number]  
Admission:  03/21/18     Attend Phys:    Noam Perkins DPM 
Discharge:               Date of Birth:  [birth year]  
         Report #: [id number]
                                                                     [id number]  
_______________________________________________________________________________
THIS REPORT FOR:  //name//                      
 
CC: Noam Perkins
    Kg Diazanthony
 
DATE OF SERVICE:  03/21/2018
 
 
INFECTIOUS DISEASE CONSULTATION FOLLOWUP
 
ATTENDING PHYSICIAN:  Noam Perkins DPM
 
HISTORY OF PRESENT ILLNESS:  He is here for bilateral foot superficial ulcers
likely on the basis of ischemia, had complicating secondary infection.  She was
hospitalized and subsequently discharged, received roughly 2-1/2 weeks of
parenteral therapy with vancomycin for combination of Staphylococcus aureus as
well as coag-negative Staph, the latter being likely also resistant.  Generally
has been doing somewhat better.  She has some degree of pain, although it is
seemingly manageable.  The wounds itself showed some degree of healing.
 
ASSESSMENT AND PLAN:  Skin and soft tissue infection.  We will discontinue the
parenteral therapy at this point, could remove the PICC line.  We will see her
back as needed.
 
 
 
 
 
 
 
 
 
 
 
 
 
 
 
 
 
 
 
 
 
<ELECTRONICALLY SIGNED>
                                        By:  Pradeep Wilder MD           
03/22/18     1308
D: 03/21/18 5675_______________________________________
T: 03/21/18 [id number] Wilder MD              /nt

## 2018-04-04 ENCOUNTER — HOSPITAL ENCOUNTER (OUTPATIENT)
Dept: HOSPITAL 96 - M.WC | Age: 73
End: 2018-04-04
Attending: PODIATRIST
Payer: COMMERCIAL

## 2018-04-04 DIAGNOSIS — E11.621: Primary | ICD-10-CM

## 2018-04-04 DIAGNOSIS — F32.9: ICD-10-CM

## 2018-04-04 DIAGNOSIS — E11.69: ICD-10-CM

## 2018-04-04 DIAGNOSIS — L97.521: ICD-10-CM

## 2018-04-04 DIAGNOSIS — L97.511: ICD-10-CM

## 2018-04-04 DIAGNOSIS — L89.893: ICD-10-CM

## 2018-04-04 DIAGNOSIS — E78.5: ICD-10-CM

## 2018-04-04 DIAGNOSIS — I25.10: ICD-10-CM

## 2018-04-04 DIAGNOSIS — M86.8X7: ICD-10-CM

## 2018-04-04 DIAGNOSIS — M20.40: ICD-10-CM

## 2018-04-04 DIAGNOSIS — E11.51: ICD-10-CM

## 2018-04-04 DIAGNOSIS — G60.3: ICD-10-CM

## 2018-04-04 DIAGNOSIS — E11.40: ICD-10-CM

## 2018-04-04 DIAGNOSIS — I10: ICD-10-CM

## 2018-04-04 DIAGNOSIS — Z85.828: ICD-10-CM

## 2018-04-18 ENCOUNTER — HOSPITAL ENCOUNTER (OUTPATIENT)
Dept: HOSPITAL 96 - M.WC | Age: 73
End: 2018-04-18
Attending: PODIATRIST
Payer: COMMERCIAL

## 2018-04-18 DIAGNOSIS — E11.40: ICD-10-CM

## 2018-04-18 DIAGNOSIS — M86.8X7: ICD-10-CM

## 2018-04-18 DIAGNOSIS — F32.9: ICD-10-CM

## 2018-04-18 DIAGNOSIS — M20.40: ICD-10-CM

## 2018-04-18 DIAGNOSIS — Z85.828: ICD-10-CM

## 2018-04-18 DIAGNOSIS — E11.69: ICD-10-CM

## 2018-04-18 DIAGNOSIS — G60.3: ICD-10-CM

## 2018-04-18 DIAGNOSIS — E78.5: ICD-10-CM

## 2018-04-18 DIAGNOSIS — L97.521: ICD-10-CM

## 2018-04-18 DIAGNOSIS — L89.893: ICD-10-CM

## 2018-04-18 DIAGNOSIS — E11.51: ICD-10-CM

## 2018-04-18 DIAGNOSIS — E11.621: Primary | ICD-10-CM
